# Patient Record
Sex: MALE | Race: OTHER | URBAN - NONMETROPOLITAN AREA
[De-identification: names, ages, dates, MRNs, and addresses within clinical notes are randomized per-mention and may not be internally consistent; named-entity substitution may affect disease eponyms.]

---

## 2020-11-12 ENCOUNTER — OFFICE VISIT (OUTPATIENT)
Dept: FAMILY MEDICINE CLINIC | Age: 20
End: 2020-11-12

## 2020-11-12 VITALS
SYSTOLIC BLOOD PRESSURE: 118 MMHG | HEART RATE: 72 BPM | WEIGHT: 142.2 LBS | RESPIRATION RATE: 12 BRPM | TEMPERATURE: 97.7 F | DIASTOLIC BLOOD PRESSURE: 62 MMHG | HEIGHT: 68 IN | BODY MASS INDEX: 21.55 KG/M2

## 2020-11-12 PROCEDURE — 17110 DESTRUCTION B9 LES UP TO 14: CPT | Performed by: FAMILY MEDICINE

## 2020-11-12 PROCEDURE — 99212 OFFICE O/P EST SF 10 MIN: CPT | Performed by: FAMILY MEDICINE

## 2020-11-12 ASSESSMENT — PATIENT HEALTH QUESTIONNAIRE - PHQ9
SUM OF ALL RESPONSES TO PHQ QUESTIONS 1-9: 0
SUM OF ALL RESPONSES TO PHQ9 QUESTIONS 1 & 2: 0
SUM OF ALL RESPONSES TO PHQ9 QUESTIONS 1 & 2: 0
SUM OF ALL RESPONSES TO PHQ QUESTIONS 1-9: 0
SUM OF ALL RESPONSES TO PHQ QUESTIONS 1-9: 0
1. LITTLE INTEREST OR PLEASURE IN DOING THINGS: 0
SUM OF ALL RESPONSES TO PHQ QUESTIONS 1-9: 0
2. FEELING DOWN, DEPRESSED OR HOPELESS: 0
2. FEELING DOWN, DEPRESSED OR HOPELESS: 0
1. LITTLE INTEREST OR PLEASURE IN DOING THINGS: 0

## 2020-11-12 ASSESSMENT — ENCOUNTER SYMPTOMS: COLOR CHANGE: 0

## 2020-11-12 NOTE — PROGRESS NOTES
[address] [place] [address] [address]  Dept: [phone]  Dept Fax: [phone]  Loc: [phone]      Karlos Hansen is a 23 y.o. male who presents today for:  Chief Complaint   Patient presents with    Other     WART REMOVAL        Goals    None         HPI:     Rash   This is a chronic problem. The current episode started more than 1 month ago. The problem has been gradually worsening since onset. The affected locations include the left hand. He was exposed to an ill contact. Patient presents for wart removal.  He says it has been there for a long time. Has not changed in quality since it started. Wart is located on left dorsal aspect of middle finger. Has two smaller ones near nailbed. Had family members with similar wart that also resolved after treatment at our facility. Has not tried any treatment. Not worsened with anything in particular. History reviewed. No pertinent past medical history. History reviewed. No pertinent surgical history. History reviewed. No pertinent family history. Social History     Tobacco Use    Smoking status: Never Smoker    Smokeless tobacco: Never Used   Substance Use Topics    Alcohol use: Not Currently      No current outpatient medications on file. No current facility-administered medications for this visit. No Known Allergies    Health Maintenance   Topic Date Due    Varicella vaccine (1 of 2 - 2-dose childhood series) 01/27/2000    HPV vaccine (1 - Male 2-dose series) 01/27/2010    HIV screen  01/27/2014    DTaP/Tdap/Td vaccine (1 - Tdap) 01/27/2018    Flu vaccine (1) 09/01/2020    Hepatitis A vaccine  Aged Out    Hepatitis B vaccine  Aged Out    Hib vaccine  Aged Out    Meningococcal (ACWY) vaccine  Aged Out    Pneumococcal 0-64 years Vaccine  Aged Out       Subjective:      Review of Systems   Skin: Positive for wound.  Negative for color change and rash. Psychiatric/Behavioral: Negative for dysphoric mood. All other systems reviewed and are negative. Objective:     Vitals:    11/12/20 1605   BP: 118/62   Site: Left Upper Arm   Position: Sitting   Cuff Size: Medium Adult   Pulse: 72   Resp: 12   Temp: 97.7 °F (36.5 °C)   TempSrc: Temporal   Weight: 142 lb 3.2 oz (64.5 kg)   Height: 5' 8\" (1.727 m)       Body mass index is 21.62 kg/m². Wt Readings from Last 3 Encounters:   11/12/20 142 lb 3.2 oz (64.5 kg)     BP Readings from Last 3 Encounters:   11/12/20 118/62       Physical Exam  Vitals signs and nursing note reviewed. Constitutional:       Appearance: Normal appearance. He is normal weight. HENT:      Head: Normocephalic and atraumatic. Nose: Nose normal.      Mouth/Throat:      Mouth: Mucous membranes are moist.      Pharynx: Oropharynx is clear. Eyes:      Conjunctiva/sclera: Conjunctivae normal.   Neck:      Musculoskeletal: Normal range of motion and neck supple. Pulmonary:      Effort: Pulmonary effort is normal. No respiratory distress. Skin:     General: Skin is warm and dry. Findings: No rash. Neurological:      General: No focal deficit present. Mental Status: He is alert. Mental status is at baseline. Psychiatric:         Mood and Affect: Mood normal.         Behavior: Behavior normal.         No results found for: WBC, HGB, HCT, PLT, CHOL, TRIG, HDL, LDLDIRECT, LDLCALC, AST, NA, K, CL, CREATININE, BUN, CO2, TSH, PSA, INR, GLUF, LABA1C, LABMICR, LABGLOM, MG, CALCIUM, VITD25    Imaging Results:    No results found. Assessment/Plan:     Antonio was seen today for other. Diagnoses and all orders for this visit:    Viral warts, unspecified type  Appears to be typical viral wart. Given cryotherapy and will see back in 2 weeks.         PROCEDURE NOTE   Indication: Viral Warts  Location:  Left 3rd finger dorsal aspect  Liquid nitrogen was applied for 10 seconds 3 times after a 30 thaw period to the skin lesions and the expected blistering or scabbing reaction explained. Patient tolerated well, no blood loss. Advised patient to not pick at the areas. Patient reminded to expect hypopigmented scars from the procedure. Advised return if lesions fail to fully resolve in next 2 weeks. Return in about 2 weeks (around 11/26/2020). Medications Prescribed:  No orders of the defined types were placed in this encounter. No future appointments. Patient given educational materials - see patient instructions. Discussed use, benefit, and sideeffects of prescribed medications. All patient questions answered. Pt voiced understanding. Reviewed health maintenance. Instructed to continue current medications, diet and exercise. Patient agreed with treatment plan. Follow up as directed. Attending attestation:  I personally performed and participated key or critical portions of the evaluation and management including personally performing the exam and medical decision making. I verify the accuracy of the documentation by the resident with the following addition or changes:  Procedure freeze warts:    Verrucous lesions identified (see exam above). Patient was counseled on risk of procedure including bleeding, infection, blister formation, scarring, lesion recurrence, and possible need for repeat procedure and benefits of procedure including possible lesion resolution. Consent signed. Each lesion frozen with liquid nitrogen until ice ball formed x 3. Patient tolerated procedure well without complication. No blood loss. Counseled on post procedure care.          Electronically signed by Britt Wadsworth MD on 11/12/2020 at 5:08 PM      Electronically signed by Britt Wadsworth MD on 11/12/2020 at 5:08 PM

## 2020-11-12 NOTE — PATIENT INSTRUCTIONS
el tratamiento de ácido salicílico sergo 2 o 3 meses, o la cinta [place] de 1 a 2 meses. · Si goodman médico le receta un medicamento para poner [employer], úselo exactamente bebeto se le indicó. Llame a goodman médico si maxine estar teniendo problemas con goodman medicamento. Para las verrugas plantares (del pie)  · Use medias y zapatos cómodos. Evite los zapatos de tacones altos y los que ejerzan mucha presión [employer]. · Proteja la verruga con richardson almohadilla de fieltro en forma de rosquilla o un parche de neil especial llamada \"molesquín\". Puede comprar estos productos en [place] Rake. Coloque la almohadilla alrededor de la verruga plantar de modo que alivie la presión sobre la verruga. También puede colocar almohadillas o cojines en shira zapatos para caminar con más comodidad. · [place] un analgésico (medicamento para el dolor) de venta prema, bebeto acetaminofén (Tylenol), ibuprofeno (Advil, Motrin) o naproxeno (Aleve). Bea y siga todas las instrucciones de la [place]. · No tome dos o más analgésicos al mismo tiempo a menos que el médico se lo haya indicado. Muchos analgésicos contienen acetaminofén, es decir, Tylenol. El exceso de acetaminofén (Tylenol) puede ser dañino. Cómo evitar la propagación de verrugas  · [employer] verrugas cubiertas con richardson venda o richardson [place]. · No se muerda las uñas ni las cutículas. [place] puede propagar las verrugas de un dedo a otro. ¿Cuándo debe pedir ayuda? Llame a goodman médico ahora mismo o busque atención médica inmediata si:    · Tiene señales de infección, tales bebeto:  ? Aumento del dolor, la hinchazón, la temperatura o el enrojecimiento. ? Vetas rojizas que salen de richardson verruga. ? Pus que sale de richardson verruga. ? Fiebre. Preste especial atención a los cambios en goodman ramon y asegúrese de comunicarse con goodman médico si:    · No mejora bebeto se esperaba. ¿Dónde puede encontrar más información en inglés? Ladona Sandhya a [email or web address]. Danielle Dear a goodman cuenta de Tl. Bri Metzger Y545 en el [facility] People \"Search Health Information\" para más información (en inglés) sobre \"Verrugas: Instrucciones de cuidado. \"     Si no tiene richardson cuenta, paula umang en el enlace \"Sign Up Now\". Revisado: 2 julio, 9983               MHPDLKK del contenido: 12.6  © 3921-8127 Healthwise, Incorporated. Las instrucciones de cuidado fueron adaptadas bajo licencia por [facility] CARE ([facility]). Si usted tiene [place] afección médica o sobre estas instrucciones, siempre pregunte a turner profesional de ramon. Healthwise, Incorporated niega toda garantía o responsabilidad por turner uso de esta información. Patient Education        Extracción de richardson lesión de la piel: Corrine Mann en el hogar  Skin Lesion Removal: What to Expect at Home  Turner recuperación  Después del procedimiento no debería tener mucho dolor. Sin embargo es normal que tenga un poco de dolor, hinchazón o moretones. Es posible que el médico le recomiende medicamentos de venta prema para ayudar con las [place]. La mayoría de las personas pueden regresar a shira rutinas habituales el mismo día del procedimiento. El tiempo que tardará la herida en sanar depende del tamaño de la herida y del tipo de procedimiento que se hizo. La mayoría de las heridas tardan de 1 a 3 semanas en sanar. Si le parker hecho richardson cirugía con láser, es posible que turner piel cambie de color y después lentamente vuelva a turner color normal.  Podría necesitar solo richardson venda o manuel vez algunos puntos de sutura. Si le pusieron puntos de sutura, es probable que [employer] retire de 5 a 14 días después. Si la sutura se hizo con puntos que se disuelven, no será necesario quitarlos. Desaparecerán por sí solos. Esta hoja de [employer] idea general del tiempo que le llevará recuperarse. Sin embargo, cada persona se recupera a un ritmo diferente. Siga los pasos que se mencionan a continuación para recuperarse lo más rápido posible.   ¿Cómo puede cuidarse en el hogar? Actividad    · Sergo los primeros días, trate de no golpearse la herida.     · Dependiendo de dónde tenga la herida, puede ser necesario que evite el ejercicio vigoroso sergo 2 semanas después del procedimiento o hasta que el médico lo apruebe.     · Si le eliminaron richardson lesión de la khalida, no utilice maquillaje cerca de la herida hasta que le hayan retirado los puntos de sutura.     · Pregúntele a goodman médico cuándo puede ducharse, bañarse o nadar. Medicamentos    · Goodman médico le dirá si puede volver a mitchel shira medicamentos y cuándo puede volver a hacerlo. También le dará indicaciones sobre cualquier medicamento nuevo que deba mitchel usted.     · Si yulia aspirina o cualquier otro medicamento que previene los coágulos de [place], pregúntele a goodman médico si debería volver a tomarlo y en qué momento. Asegúrese de entender exactamente lo que goodman médico quiere que paula.     · Sea francisco con los medicamentos. [place] los analgésicos (medicamentos para el dolor) exactamente bebeto le fueron indicados. ? Si el médico le recetó un analgésico, tómelo según las indicaciones. ? Si no está tomando un analgésico recetado, pregúntele a goodman médico si puede mitchel vicky de [employer]. Cuidado de la herida    · Si el médico le dijo cómo cuidarse la incisión, siga las instrucciones de goodman médico. Si no le albino instrucciones, siga estos consejos generales:  ? Mantenga la herida vendada y seca el primer día. ? Después de las primeras 24 a 48 horas, lávese la carlos alrededor de la herida con agua limpia 2 veces al día. No use peróxido de hidrógeno (agua [place]) ni alcohol, los cuales pueden retrasar la sanación. ? Puede cubrirse la herida con richardson capa delgada de vaselina y [place] Kristy venda no adherente. ? Aplíquese más [facility] y [facility] la venda según sea necesario.     · Si tiene puntos de sutura, es posible que reciba otras instrucciones.     · Si se le forma richardson costra, no se la arranque. Deje que se caiga por sí yash.  [place] sanan más rápido si no se les ismael costras. Raheel la carlos todos los días y usar vaselina ayudarán a prevenir que se formen costras.     · Si le sangra la herida, presione directamente sobre amalia con un paño limpio hasta detener el sangrado.     · Si le \"congelaron\" la lesión, le puede salir richardson [place]. No la reviente. Deje que se seque por sí yash. Es común que la ampolla se llene con [place]. No es necesario hacer nada al respecto, ghanshyam si le duele demasiado, llame a goodman médico.     · Evite el sol hasta que se le retiren los puntos de sutura. La atención de seguimiento es richardson parte clave de goodman tratamiento y seguridad. Asegúrese de hacer y acudir a todas las citas, y llame a goodman médico si está teniendo problemas. También es richardson buena idea saber los resultados de los exámenes y mantener richardson lista de los medicamentos que yulia. ¿Cuándo debe pedir ayuda? Llame al 911 en cualquier momento que considere que necesita atención de [place]. Por ejemplo, llame si:    · Se desmayó (perdió el conocimiento).     · Tiene mucha dificultad para respirar.     · Tiene dolor repentino en el pecho y falta de [place], o tose eduardo. Llame a goodman médico ahora mismo o busque atención médica inmediata si:    · Tiene síntomas de un coágulo de eduardo en la pierna (conocido bebeto trombosis venosa profunda), tales bebeto:  ? Dolor en la pantorrilla, el muslo, la regulo o detrás de la rodilla. ? Enrojecimiento e hinchazón en la pierna o la regulo.     · Tiene señales de infección, tales bebeto:  ? Aumento del dolor, la hinchazón, la temperatura o el enrojecimiento. ? Vetas rojizas que salen de la herida. ? Pus que sale de la herida. ? Patty Mitchell.     · Tiene dolor que no mejora después de mitchel analgésicos.     · Tiene puntos de sutura flojos. Preste especial atención a los cambios en goodman ramon y asegúrese de comunicarse con goodman médico si tiene algún problema. ¿Dónde puede encontrar más información en inglés?   Dante Rosa a [email or web address] e ingrese a goodman cuenta de MyChart. Hermelinda Noon Q228 en el cuadro \"Search Health Information\" para más información (en inglés) sobre \"Extracción de richardson lesión de la piel: Royer Lento en el hogar. \"     Si no tiene richardson cuenta, paula clmartínez en el enlace \"Sign Up Now\". Revisado: 2 julio, 1491               CHARYYJAMILCAR del contenido: 12.6  © 7701-5701 Healthwise, Incorporated. Las instrucciones de cuidado fueron adaptadas bajo licencia por [facility] CARE ([facility]). Si usted tiene [place] afección médica o sobre estas instrucciones, siempre pregunte a goodman profesional de ramon. Healthwise, Incorporated niega toda garantía o responsabilidad por goodman uso de esta información.